# Patient Record
(demographics unavailable — no encounter records)

---

## 2024-12-10 NOTE — PLAN
[FreeTextEntry1] : 26yo P1 presenting to discuss trying to conceive following delivery c/b retained placenta and suspected focal accreta.  -Pelvic US reviewed today. Overall normal appearing uterus and endometrial lining. Possible small focal calcifications, decreased in size since US 6 months ago -Discussed increased risk of placenta accreta following episode of focal accreta -Discussed placenta accreta spectrum, counseled patient that for non-focal accreta spectrum placentation pregnancy is at risk of miscarriage, bleeding,  delivery, uterine rupture, bowel/bladder/other organ damage. Patient understands that management of accreta is most often delivery via C/S followed by hysterectomy if placenta cannot be removed  Patient wishes to continue to wait to conceive and will return for further pre-conception discussion with MD Kristen Fine MD

## 2024-12-10 NOTE — PHYSICAL EXAM
[Appropriately responsive] : appropriately responsive [Alert] : alert [No Acute Distress] : no acute distress [Regular Rate Rhythm] : regular rate rhythm [Soft] : soft [Non-tender] : non-tender [Non-distended] : non-distended [FreeTextEntry5] : nl work of breathing

## 2024-12-10 NOTE — HISTORY OF PRESENT ILLNESS
[FreeTextEntry1] : 26yo P1 presenting for 6mo follow up to discuss future pregnancy in setting of hx of retained placenta w/ suspected focal accreta following full term delivery in 4/2024. Patient followed with Dr. Peterson at that time and underwent UAE.   At this time, patient is feeling well. She has been having regular menses. No abnormal bleeding or pelvic pain.   Patient is interested in having another child.

## 2025-01-17 NOTE — PLAN
[FreeTextEntry1] : pt is allowed to get pregnant when ready. Lining of the uterus is clear Labs drawn f/u for annual when not on period

## 2025-01-17 NOTE — HISTORY OF PRESENT ILLNESS
[FreeTextEntry1] : pt presents to the office for follow up post accreta. Pt has had normal periods since the expelled placenta. Pt has no pain or discomfort. Pt would like to try and get pregnant at the end of the year.

## 2025-02-27 NOTE — DISCUSSION/SUMMARY
[FreeTextEntry1] : -uterus is normal sized and mobile -f/u PAP and GC/CT done today -contraception: none, looking to get pregnant -f/u PRN

## 2025-02-27 NOTE — PHYSICAL EXAM

## 2025-02-27 NOTE — HISTORY OF PRESENT ILLNESS
[Regular Cycle Intervals] : periods have been regular [Currently Active] : currently active [Men] : men [Vaginal] : vaginal [No] : No [PapSmeardate] : 2023 [FreeTextEntry1] : 02/07/2025

## 2025-07-25 NOTE — PLAN
[FreeTextEntry1] : 25 yo female with clot during menses: -gyn wnl; see official sono report -discussed with patient that an occasional clot is not cause for concern, pt will to continue to monitor and follow up if clots worsen

## 2025-07-25 NOTE — HISTORY OF PRESENT ILLNESS
[FreeTextEntry1] : 25 yo female presents today for gyn sono due to having clots during her menses. She reports an lmp of 7/20/25. She states that her flow as normal but then yesterday she passed a large size clot. She reports bleeding has been normal since. She reports a history of UAE due to retained poc with placenta accreta in 05/2024.